# Patient Record
Sex: MALE | Race: WHITE | NOT HISPANIC OR LATINO | Employment: OTHER | URBAN - NONMETROPOLITAN AREA
[De-identification: names, ages, dates, MRNs, and addresses within clinical notes are randomized per-mention and may not be internally consistent; named-entity substitution may affect disease eponyms.]

---

## 2023-05-11 ENCOUNTER — HOSPITAL ENCOUNTER (EMERGENCY)
Facility: HOSPITAL | Age: 88
Discharge: HOME | End: 2023-05-11
Attending: STUDENT IN AN ORGANIZED HEALTH CARE EDUCATION/TRAINING PROGRAM
Payer: MEDICARE

## 2023-05-11 VITALS
HEART RATE: 70 BPM | RESPIRATION RATE: 18 BRPM | WEIGHT: 218 LBS | DIASTOLIC BLOOD PRESSURE: 60 MMHG | SYSTOLIC BLOOD PRESSURE: 140 MMHG | TEMPERATURE: 98 F | OXYGEN SATURATION: 98 %

## 2023-05-11 DIAGNOSIS — K62.89 PROCTITIS: Primary | ICD-10-CM

## 2023-05-11 DIAGNOSIS — K59.00 CONSTIPATION, UNSPECIFIED CONSTIPATION TYPE: ICD-10-CM

## 2023-05-11 LAB
ALBUMIN SERPL BCP-MCNC: 3.3 G/DL (ref 3.5–5.2)
ALP SERPL-CCNC: 86 U/L (ref 55–135)
ALT SERPL W/O P-5'-P-CCNC: 32 U/L (ref 10–44)
ANION GAP SERPL CALC-SCNC: 2 MMOL/L (ref 8–16)
AST SERPL-CCNC: 22 U/L (ref 10–40)
BASOPHILS # BLD AUTO: 0.03 K/UL (ref 0–0.2)
BASOPHILS NFR BLD: 0.8 % (ref 0–1.9)
BILIRUB SERPL-MCNC: 1 MG/DL (ref 0.1–1)
BUN SERPL-MCNC: 16 MG/DL (ref 8–23)
CALCIUM SERPL-MCNC: 9.3 MG/DL (ref 8.7–10.5)
CHLORIDE SERPL-SCNC: 109 MMOL/L (ref 95–110)
CO2 SERPL-SCNC: 28 MMOL/L (ref 23–29)
CREAT SERPL-MCNC: 1.2 MG/DL (ref 0.5–1.4)
DIFFERENTIAL METHOD: ABNORMAL
EOSINOPHIL # BLD AUTO: 0.1 K/UL (ref 0–0.5)
EOSINOPHIL NFR BLD: 1.6 % (ref 0–8)
ERYTHROCYTE [DISTWIDTH] IN BLOOD BY AUTOMATED COUNT: 16.8 % (ref 11.5–14.5)
EST. GFR  (NO RACE VARIABLE): 57.8 ML/MIN/1.73 M^2
GLUCOSE SERPL-MCNC: 124 MG/DL (ref 70–110)
HCT VFR BLD AUTO: 32.7 % (ref 40–54)
HGB BLD-MCNC: 10.8 G/DL (ref 14–18)
IMM GRANULOCYTES # BLD AUTO: 0.03 K/UL (ref 0–0.04)
IMM GRANULOCYTES NFR BLD AUTO: 0.8 % (ref 0–0.5)
LYMPHOCYTES # BLD AUTO: 1 K/UL (ref 1–4.8)
LYMPHOCYTES NFR BLD: 27.2 % (ref 18–48)
MCH RBC QN AUTO: 28.8 PG (ref 27–31)
MCHC RBC AUTO-ENTMCNC: 33 G/DL (ref 32–36)
MCV RBC AUTO: 87 FL (ref 82–98)
MONOCYTES # BLD AUTO: 0.2 K/UL (ref 0.3–1)
MONOCYTES NFR BLD: 6.3 % (ref 4–15)
NEUTROPHILS # BLD AUTO: 2.3 K/UL (ref 1.8–7.7)
NEUTROPHILS NFR BLD: 63.3 % (ref 38–73)
NRBC BLD-RTO: 0 /100 WBC
PLATELET # BLD AUTO: 165 K/UL (ref 150–450)
PMV BLD AUTO: 10.9 FL (ref 9.2–12.9)
POTASSIUM SERPL-SCNC: 4.2 MMOL/L (ref 3.5–5.1)
PROT SERPL-MCNC: 6.9 G/DL (ref 6–8.4)
RBC # BLD AUTO: 3.75 M/UL (ref 4.6–6.2)
SODIUM SERPL-SCNC: 139 MMOL/L (ref 136–145)
WBC # BLD AUTO: 3.67 K/UL (ref 3.9–12.7)

## 2023-05-11 PROCEDURE — 36415 COLL VENOUS BLD VENIPUNCTURE: CPT | Performed by: STUDENT IN AN ORGANIZED HEALTH CARE EDUCATION/TRAINING PROGRAM

## 2023-05-11 PROCEDURE — 99284 EMERGENCY DEPT VISIT MOD MDM: CPT | Mod: 25

## 2023-05-11 PROCEDURE — 85025 COMPLETE CBC W/AUTO DIFF WBC: CPT | Performed by: STUDENT IN AN ORGANIZED HEALTH CARE EDUCATION/TRAINING PROGRAM

## 2023-05-11 PROCEDURE — 80053 COMPREHEN METABOLIC PANEL: CPT | Performed by: STUDENT IN AN ORGANIZED HEALTH CARE EDUCATION/TRAINING PROGRAM

## 2023-05-11 RX ORDER — POLYETHYLENE GLYCOL 3350 17 G/17G
17 POWDER, FOR SOLUTION ORAL DAILY
Qty: 116 G | Refills: 0 | Status: SHIPPED | OUTPATIENT
Start: 2023-05-11

## 2023-05-11 RX ORDER — SYRING-NEEDL,DISP,INSUL,0.3 ML 29 G X1/2"
148 SYRINGE, EMPTY DISPOSABLE MISCELLANEOUS ONCE
Qty: 148 ML | Refills: 0 | Status: SHIPPED | OUTPATIENT
Start: 2023-05-11 | End: 2023-05-11

## 2023-05-11 RX ORDER — CIPROFLOXACIN 500 MG/1
500 TABLET ORAL EVERY 12 HOURS
Qty: 28 TABLET | Refills: 0 | Status: SHIPPED | OUTPATIENT
Start: 2023-05-11 | End: 2023-05-25

## 2023-05-11 NOTE — ED PROVIDER NOTES
Dictation #1  MRN:[id number]  CSN:[id number] Encounter Date: 5/11/2023       History     Chief Complaint   Patient presents with    Abdominal Pain     Pt c/o lower abd pain and constipation x2 days. Denies urinary symptoms      89-year-old male with history of constipation not on pain medication no history of abdominal surgery presents with constipation for the past 2 days associated with lower abdominal pain.  Patient says that he falls a GI physician and gets daily medication which he says has not been helping.  Patient has follow-up later on this month.  Patient denies any vomiting, testicular pain, dysuria, diarrhea, fever.  Patient said that he is able to pass gas    Review of patient's allergies indicates:  No Known Allergies  History reviewed. No pertinent past medical history.  History reviewed. No pertinent surgical history.  History reviewed. No pertinent family history.     Review of Systems   Constitutional: Negative.    HENT: Negative.     Respiratory: Negative.     Cardiovascular: Negative.    Gastrointestinal:  Positive for abdominal pain and constipation.   Genitourinary: Negative.    Musculoskeletal: Negative.    Skin: Negative.    Neurological: Negative.    Psychiatric/Behavioral: Negative.     All other systems reviewed and are negative.    Physical Exam     Initial Vitals   BP Pulse Resp Temp SpO2   05/11/23 0804 05/11/23 0804 05/11/23 0804 05/11/23 0825 05/11/23 0825   138/80 98 18 98.2 °F (36.8 °C) 98 %      MAP       --                Physical Exam    Nursing note and vitals reviewed.  Constitutional: Vital signs are normal. He appears well-developed and well-nourished.   HENT:   Head: Normocephalic and atraumatic.   Eyes: Conjunctivae and lids are normal.   Neck: Trachea normal. Neck supple.   Cardiovascular:  Normal rate, regular rhythm, normal heart sounds and normal pulses.           Pulmonary/Chest: Breath sounds normal. He has no wheezes. He has no rhonchi.   Abdominal: Abdomen is soft.  Bowel sounds are normal. He exhibits no distension. There is abdominal tenderness.   None peritonitic There is no rebound and no guarding.   Musculoskeletal:         General: Normal range of motion.      Cervical back: Neck supple.     Neurological: He is alert and oriented to person, place, and time. He has normal strength. GCS eye subscore is 4. GCS verbal subscore is 5. GCS motor subscore is 6.   Skin: Skin is warm. Capillary refill takes less than 2 seconds.   Psychiatric: He has a normal mood and affect. His speech is normal. Thought content normal.       ED Course   Procedures  Labs Reviewed   CBC W/ AUTO DIFFERENTIAL - Abnormal; Notable for the following components:       Result Value    WBC 3.67 (*)     RBC 3.75 (*)     Hemoglobin 10.8 (*)     Hematocrit 32.7 (*)     RDW 16.8 (*)     Immature Granulocytes 0.8 (*)     Mono # 0.2 (*)     All other components within normal limits   COMPREHENSIVE METABOLIC PANEL - Abnormal; Notable for the following components:    Glucose 124 (*)     Albumin 3.3 (*)     Anion Gap 2 (*)     eGFR 57.8 (*)     All other components within normal limits          Imaging Results              CT Abdomen Pelvis  Without Contrast (Final result)  Result time 05/11/23 09:01:57      Final result by Jarek Valdez MD (05/11/23 09:01:57)                   Impression:      1. Rectal wall thickening with some perirectal fat stranding, may reflect proctitis.  2. Evidence of mesenteric panniculitis.  3. Slightly nodular liver surface contour, could reflect cirrhosis.      Electronically signed by: Jarek Valdez MD  Date:    05/11/2023  Time:    09:01               Narrative:    EXAMINATION:  CT ABDOMEN PELVIS WITHOUT CONTRAST    CLINICAL HISTORY:  LLQ abdominal pain;Bowel obstruction suspected;    TECHNIQUE:  Axial CT images were obtained. Iterative reconstruction technique was used. CT/cardiac nuclear exam/s in prior 12 months: 0.    COMPARISON:  None.    FINDINGS:  Liver demonstrates a  benign slightly nodular surface contour, may reflect cirrhosis.  The liver is otherwise unremarkable.  No gallstones.  The spleen, pancreas, adrenal glands and kidneys demonstrate no abnormality.  There is no gross gastric abnormality.  There are no dilated loops of bowel.  Moderate amount of stool in colon.  There is some rectal wall thickening with adjacent fat stranding, may reflect proctitis.  The appendix is normal.    No abnormality of urinary bladder.    No aortic aneurysm.  No lymph node enlargement.  Mild increased attenuation in the small bowel mesentery with some scattered nonenlarged lymph nodes, consistent with mesenteric panniculitis.  Visualized lung bases are clear.  There is no osseous abnormality.                                       Medications - No data to display  Medical Decision Making:   Initial Assessment:   89-year-old male with history of constipation not on pain medication no history of abdominal surgery presents with constipation for the past 2 days associated with lower abdominal pain.  Afebrile vitals stable.  Physical noted.  Will check for obstruction.  Re-evaluate  Clinical Tests:   Lab Tests: Ordered and Reviewed  The following lab test(s) were unremarkable: CBC and CMP  Radiological Study: Ordered and Reviewed           ED Course as of 05/11/23 1002   Thu May 11, 2023   0959 Labs and imaging noted.  Proctitis.  No concerns for radiation or STD.  Will treat with ciprofloxacin.  MiraLax and Mag citrate.  Patient advised to follow-up with GI physician.  Return precautions given.  Abdomen remains soft non peritonitic [HD]      ED Course User Index  [HD] John Barrios MD                 Clinical Impression:   Final diagnoses:  [K59.00] Constipation, unspecified constipation type  [K62.89] Proctitis (Primary)        ED Disposition Condition    Discharge Stable          ED Prescriptions       Medication Sig Dispense Start Date End Date Auth. Provider    ciprofloxacin HCl (CIPRO) 500 MG  tablet Take 1 tablet (500 mg total) by mouth every 12 (twelve) hours. for 14 days 28 tablet 5/11/2023 5/25/2023 John Barrios MD    polyethylene glycol (GLYCOLAX) 17 gram/dose powder Take 17 g by mouth once daily. 116 g 5/11/2023 -- John Barrios MD    magnesium citrate solution (Expires today) Take 148 mLs by mouth once. for 1 dose 148 mL 5/11/2023 5/11/2023 John Barrios MD          Follow-up Information       Follow up With Specialties Details Why Contact Info    [facility]  In 2 days  [address]  [address]  [place] [facility] [place] [id number] [phone]               John Barrios MD  05/11/23 1002